# Patient Record
Sex: FEMALE | Race: WHITE | NOT HISPANIC OR LATINO | URBAN - METROPOLITAN AREA
[De-identification: names, ages, dates, MRNs, and addresses within clinical notes are randomized per-mention and may not be internally consistent; named-entity substitution may affect disease eponyms.]

---

## 2023-02-15 ENCOUNTER — HOSPITAL ENCOUNTER (EMERGENCY)
Facility: OTHER | Age: 23
Discharge: HOME | End: 2023-02-15
Attending: EMERGENCY MEDICINE
Payer: COMMERCIAL

## 2023-02-15 VITALS
RESPIRATION RATE: 16 BRPM | OXYGEN SATURATION: 100 % | DIASTOLIC BLOOD PRESSURE: 86 MMHG | SYSTOLIC BLOOD PRESSURE: 138 MMHG | TEMPERATURE: 98 F | HEART RATE: 87 BPM

## 2023-02-15 DIAGNOSIS — H10.32 ACUTE CONJUNCTIVITIS OF LEFT EYE, UNSPECIFIED ACUTE CONJUNCTIVITIS TYPE: ICD-10-CM

## 2023-02-15 DIAGNOSIS — H10.12 ALLERGIC CONJUNCTIVITIS OF LEFT EYE: Primary | ICD-10-CM

## 2023-02-15 DIAGNOSIS — T78.40XA ALLERGIC REACTION, INITIAL ENCOUNTER: ICD-10-CM

## 2023-02-15 PROCEDURE — 25000003 PHARM REV CODE 250: Performed by: PHYSICIAN ASSISTANT

## 2023-02-15 PROCEDURE — 99284 EMERGENCY DEPT VISIT MOD MDM: CPT | Mod: 25

## 2023-02-15 RX ORDER — DIPHENHYDRAMINE HCL 25 MG
25 CAPSULE ORAL
Status: COMPLETED | OUTPATIENT
Start: 2023-02-15 | End: 2023-02-15

## 2023-02-15 RX ORDER — FAMOTIDINE 20 MG/1
20 TABLET, FILM COATED ORAL 2 TIMES DAILY
Qty: 20 TABLET | Refills: 0 | Status: SHIPPED | OUTPATIENT
Start: 2023-02-15 | End: 2024-02-15

## 2023-02-15 RX ORDER — MOXIFLOXACIN 5 MG/ML
1 SOLUTION/ DROPS OPHTHALMIC
Status: COMPLETED | OUTPATIENT
Start: 2023-02-15 | End: 2023-02-15

## 2023-02-15 RX ORDER — DIPHENHYDRAMINE HCL 25 MG
25 CAPSULE ORAL EVERY 6 HOURS PRN
Qty: 20 CAPSULE | Refills: 0 | Status: SHIPPED | OUTPATIENT
Start: 2023-02-15

## 2023-02-15 RX ORDER — FAMOTIDINE 20 MG/1
20 TABLET, FILM COATED ORAL
Status: COMPLETED | OUTPATIENT
Start: 2023-02-15 | End: 2023-02-15

## 2023-02-15 RX ORDER — PREDNISONE 20 MG/1
40 TABLET ORAL DAILY
Qty: 10 TABLET | Refills: 0 | Status: SHIPPED | OUTPATIENT
Start: 2023-02-15 | End: 2023-02-20

## 2023-02-15 RX ORDER — PROPARACAINE HYDROCHLORIDE 5 MG/ML
1 SOLUTION/ DROPS OPHTHALMIC
Status: COMPLETED | OUTPATIENT
Start: 2023-02-15 | End: 2023-02-15

## 2023-02-15 RX ORDER — MOXIFLOXACIN 5 MG/ML
1 SOLUTION/ DROPS OPHTHALMIC 2 TIMES DAILY
Qty: 7 ML | Refills: 0 | Status: SHIPPED | OUTPATIENT
Start: 2023-02-15 | End: 2023-02-22

## 2023-02-15 RX ADMIN — PROPARACAINE HYDROCHLORIDE 1 DROP: 5 SOLUTION/ DROPS OPHTHALMIC at 07:02

## 2023-02-15 RX ADMIN — FLUORESCEIN SODIUM 1 EACH: 1 STRIP OPHTHALMIC at 07:02

## 2023-02-15 RX ADMIN — DIPHENHYDRAMINE HYDROCHLORIDE 25 MG: 25 CAPSULE ORAL at 07:02

## 2023-02-15 RX ADMIN — MOXIFLOXACIN 1 DROP: 5 SOLUTION/ DROPS OPHTHALMIC at 08:02

## 2023-02-15 RX ADMIN — FAMOTIDINE 20 MG: 20 TABLET, FILM COATED ORAL at 07:02

## 2023-02-16 ENCOUNTER — TELEPHONE (OUTPATIENT)
Dept: OPHTHALMOLOGY | Facility: CLINIC | Age: 23
End: 2023-02-16
Payer: COMMERCIAL

## 2023-02-16 NOTE — ED PROVIDER NOTES
Encounter Date: 2/15/2023    SCRIBE #1 NOTE: I, Stephanie Telles, am scribing for, and in the presence of,  MARY BETH Shields.     History     Chief Complaint   Patient presents with    Eye Drainage     Left eye drainage x 20 min      Time seen by provider: 7:23 PM    This is a 22 y.o. female who presents with complaint of sudden swelling in the left eye. The patient also reports pain, irritation, and pressure in the eye. She notes that she is experiencing increased lacrimation and slight yellow drainage. She denies associated fevers, vision changes or throat swelling. Of importance, the patient reports being allergic to all tree nuts and states that she was at the grocery store when her eye began swelling. She also notes that she has monthly contacts which she immediately took out after the swelling started and reports new one was placed earlier this week. Denies any medications for the symptoms.     The history is provided by the patient.   Review of patient's allergies indicates:  Not on File  No past medical history on file.  No past surgical history on file.  No family history on file.     Review of Systems   Constitutional:  Negative for chills and fever.   HENT:  Positive for facial swelling. Negative for congestion, sinus pressure and sore throat.    Eyes:  Positive for pain, discharge and redness.        Positive for epiphora.    Respiratory:  Negative for cough and shortness of breath.    Cardiovascular:  Negative for chest pain.   Gastrointestinal:  Negative for abdominal pain, constipation, diarrhea, nausea and vomiting.   Genitourinary:  Negative for dysuria and hematuria.   Musculoskeletal:  Negative for arthralgias and myalgias.   Skin: Negative.    Neurological:  Negative for weakness, numbness and headaches.   Hematological: Negative.    Psychiatric/Behavioral:  Negative for agitation and confusion.      Physical Exam     Initial Vitals [02/15/23 1800]   BP Pulse Resp Temp SpO2   (!) 175/99 102 20  98.5 °F (36.9 °C) 100 %      MAP       --         Physical Exam    Constitutional: Vital signs are normal. She appears well-developed and well-nourished. She is cooperative. No distress.   Well appearing. Non toxic.   HENT:   Head: Normocephalic and atraumatic.   Eyes: EOM and lids are normal. Pupils are equal, round, and reactive to light. Left eye exhibits chemosis. Left eye exhibits no exudate and no hordeolum. No foreign body present in the left eye. Left conjunctiva is injected. No scleral icterus.   Slit lamp exam:       The left eye shows no corneal abrasion, no corneal flare, no corneal ulcer, no fluorescein uptake and no anterior chamber bulge.   Left eye mild injection. Mild non tender infraorbital edema. Lateral eye with chemosis. PERRL and EOM intact. No other signs of facial edema.   Neck: Trachea normal. No thyroid mass present.   Cardiovascular:  Normal rate.           Pulmonary/Chest: No respiratory distress.     Neurological: She is alert and oriented to person, place, and time. GCS eye subscore is 4. GCS verbal subscore is 5. GCS motor subscore is 6.   Skin: Skin is warm, dry and intact. No rash noted.   Psychiatric: She has a normal mood and affect. Her speech is normal and behavior is normal. Thought content normal.       ED Course   Procedures  Labs Reviewed - No data to display         Imaging Results    None       8:01 PM: Fluorescin stain with no uptake, no ulcer, no abrasion. Pain improved. No hyphema. No hypoxia.        Medications   famotidine tablet 20 mg (20 mg Oral Given 2/15/23 1938)   diphenhydrAMINE capsule 25 mg (25 mg Oral Given 2/15/23 1938)   fluorescein ophthalmic strip 1 each (1 each Both Eyes Given 2/15/23 1939)   proparacaine 0.5 % ophthalmic solution 1 drop (1 drop Both Eyes Given 2/15/23 1939)   moxifloxacin 0.5 % ophthalmic solution 1 drop (1 drop Left Eye Given 2/15/23 2029)     Medical Decision Making:   History:   Old Medical Records: I decided to obtain old medical  records.  Initial Assessment:   Emergent evaluation 22-year-old female with acute left eye injection irritation.  Symptoms began while grocery store.  Patient does report triage allergy suspect possible exposure.  She does report swelling and lid.  Symptoms resolved with Store.  Does report that she took her contacts immediately.  Denies any vision changes.  Does report some foreign body sensation   Differential Diagnosis:   Differential Diagnosis includes, but is not limited to:  Acute glaucoma, open globe, ocular foreign body, retinal detachment, vitreous hemorrhage, endophthalmitis, traumatic injury, orbital fracture, corneal abrasion/ulcer, retinal vascular occlusion, optic neuritis, periorbital/orbital cellulitis, hyphema, hypopion, iritis, UV keratitis, subconjunctival hemorrhage, conjunctivitis, blepharitis, chalazion/hordeolum, benign vitreous floaters.    Clinical Tests:   Lab Tests: Reviewed and Ordered  ED Management:  Plan for visual acuity fluorescein stain.  Suspect allergic component hence antihistamines were ordered in the ED. the contact lens use in foreign body sensation that improved with proparacaine irritant conjunctivitis given increased risk motion due to left occipital cover with moxifloxacin drops although discussed overall impression is consistent with allergic reaction.  She will be started on steroid.  She continued since.  Suspect this was given no continued exposure and believe exposure was at the grocery store.  Additional labs or workup indicated as no other signs of NG anaphylaxis or additional findings.  No signs of ulcer foreign body she will be given information for ophthalmology was encouraged to have close follow-up. Strict instructions to follow up with Given instructions to return for any acute symptoms and verbalized understanding of this medical plan.            Scribe Attestation:   Scribe #1: I performed the above scribed service and the documentation accurately describes  the services I performed. I attest to the accuracy of the note.         PA Attestation for Scribe: I, Elizabeth Jesus  , reviewed documentation as scribed in my presence, which is both accurate and complete.      Clinical Impression:   Final diagnoses:  [H10.12] Allergic conjunctivitis of left eye (Primary)  [H10.32] Acute conjunctivitis of left eye, unspecified acute conjunctivitis type  [T78.40XA] Allergic reaction, initial encounter        ED Disposition Condition    Discharge Stable          ED Prescriptions       Medication Sig Dispense Start Date End Date Auth. Provider    predniSONE (DELTASONE) 20 MG tablet Take 2 tablets (40 mg total) by mouth once daily. for 5 days 10 tablet 2/15/2023 2/20/2023 MARY BETH Shields    famotidine (PEPCID) 20 MG tablet Take 1 tablet (20 mg total) by mouth 2 (two) times daily. 20 tablet 2/15/2023 2/15/2024 MARY BETH Shields    diphenhydrAMINE (BENADRYL) 25 mg capsule Take 1 capsule (25 mg total) by mouth every 6 (six) hours as needed for Itching or Allergies. 20 capsule 2/15/2023 -- MARY BETH Shields    moxifloxacin (VIGAMOX) 0.5 % ophthalmic solution Place 1 drop into the left eye 2 (two) times a day. for 7 days 7 mL 2/15/2023 2/22/2023 MARY BETH Shields          Follow-up Information       Follow up With Specialties Details Why Contact Info    Morales Taylor MD Ophthalmology Schedule an appointment as soon as possible for a visit   [address] [facility]  [address] [place] [id number]  [phone]               MARY BETH Shields  02/16/23 0937

## 2023-02-16 NOTE — FIRST PROVIDER EVALUATION
Emergency Department TeleTriage Encounter Note      CHIEF COMPLAINT    Chief Complaint   Patient presents with    Eye Drainage     Left eye drainage x 20 min        VITAL SIGNS   Initial Vitals [02/15/23 1800]   BP Pulse Resp Temp SpO2   (!) 175/99 102 20 98.5 °F (36.9 °C) 100 %      MAP       --            ALLERGIES    Review of patient's allergies indicates:  Not on File    PROVIDER TRIAGE NOTE  Patient presents with complaint of left eye drainage and swelling. She denied trauma. She reports draining clear stuff.      Phy:   Constitutional: well nourished, well developed, appearing stated age, NAD   HEENT: NCAT, symmetrical lids, No obvious facial deformity.  Normal phonation. Normal Conjunctiva   Neck: NAROM   Respiratory: Normal effort.  No obvious use of accessory muscles   Musculoskeletal: Moved upper extremities well   Neuro: Alert, answers questions appropriately    Psych: appropriate mood and affect      Initial orders will be placed and care will be transferred to an alternate provider when patient is roomed for a full evaluation. Any additional orders and the final disposition will be determined by that provider.        ORDERS  Labs Reviewed   HIV 1 / 2 ANTIBODY   HEPATITIS C ANTIBODY   POCT URINE PREGNANCY       ED Orders (720h ago, onward)      Start Ordered     Status Ordering Provider    02/15/23 1802 02/15/23 1801  HIV 1/2 Ag/Ab (4th Gen)  STAT         Ordered GINNY CAMPBELL II    02/15/23 1802 02/15/23 1801  Hepatitis C Antibody  STAT         Ordered GINNY CAMPBELL II    02/15/23 1802 02/15/23 1801  POCT urine pregnancy  Once         Ordered GINNY CAMPBELL II    Unscheduled 02/15/23 1816  Visual acuity screening  Once         Ordered EUN SINGH              Virtual Visit Note: The provider triage portion of this emergency department evaluation and documentation was performed via [employer], a HIPAA-compliant telemedicine application, in concert with a tele-presenter  in the room. A face to face patient evaluation with one of my colleagues will occur once the patient is placed in an emergency department room.      DISCLAIMER: This note was prepared with [employer] voice recognition transcription software. Garbled syntax, mangled pronouns, and other bizarre constructions may be attributed to that software system.

## 2023-02-16 NOTE — ED TRIAGE NOTES
"Pt to ED via POV with c/o L eye swelling and drainage.  Pt states "I'm allergic to tree nuts and I was in grocery store when symptoms began"  Pt denies visual changes.  Pt AAOx4.  VSS.    Patient identifiers for Corin Carmichael checked and correct   LOC: Patient is awake, alert, and aware of environment with an appropriate affect.  Patient is oriented x4 and speaking appropriately.  APPEARANCE: Patient resting comfortably and in no acute distress.  Patient is clean and well groomed, patient's clothing is properly fastened.  SKIN: The skin is warm and dry.  Patient has normal skin turgor and moist mucus membranes.  Skin is intact: no bruising or breakdown noted.  MUSCULOSKELETAL: Patient is moving all extremities well, no obvious swelling or deformities noted. Pulses intact.  RESPIRATORY: Airway is open and patent.  Respirations are spontaneous, even and unlabored.  Normal effort and rate noted.  CARDIAC: Patient has a normal rate and rhythm.  No peripheral edema noted.  Capillary refill < 3 seconds.  ABDOMEN: No abd distention noted.  Bowel sounds active in all 4 quadrants.  Soft and non-tender upon palpation.  NEUROLOGICAL: PERRLA.  Facial expression is symmetrical.  Hand grasps are equal bilaterally.  Normal sensation in all extremities when touched with finger.  Following commands appropriately.    "

## 2023-02-16 NOTE — TELEPHONE ENCOUNTER
----- Message from Cece Choe sent at 2/16/2023 11:37 AM CST -----  Regarding: ED F/U  Pt called to schedule a ED f/u.     Call back :[phone]

## 2023-02-17 ENCOUNTER — OFFICE VISIT (OUTPATIENT)
Dept: OPHTHALMOLOGY | Facility: CLINIC | Age: 23
End: 2023-02-17
Payer: COMMERCIAL

## 2023-02-17 DIAGNOSIS — H10.12 ALLERGIC CONJUNCTIVITIS OF LEFT EYE: Primary | ICD-10-CM

## 2023-02-17 PROCEDURE — 92004 PR EYE EXAM, NEW PATIENT,COMPREHESV: ICD-10-PCS | Mod: S$GLB,,, | Performed by: OPHTHALMOLOGY

## 2023-02-17 PROCEDURE — 1159F MED LIST DOCD IN RCRD: CPT | Mod: CPTII,S$GLB,, | Performed by: OPHTHALMOLOGY

## 2023-02-17 PROCEDURE — 92004 COMPRE OPH EXAM NEW PT 1/>: CPT | Mod: S$GLB,,, | Performed by: OPHTHALMOLOGY

## 2023-02-17 PROCEDURE — 1160F RVW MEDS BY RX/DR IN RCRD: CPT | Mod: CPTII,S$GLB,, | Performed by: OPHTHALMOLOGY

## 2023-02-17 PROCEDURE — 1159F PR MEDICATION LIST DOCUMENTED IN MEDICAL RECORD: ICD-10-PCS | Mod: CPTII,S$GLB,, | Performed by: OPHTHALMOLOGY

## 2023-02-17 PROCEDURE — 99999 PR PBB SHADOW E&M-EST. PATIENT-LVL II: CPT | Mod: PBBFAC,,, | Performed by: OPHTHALMOLOGY

## 2023-02-17 PROCEDURE — 99999 PR PBB SHADOW E&M-EST. PATIENT-LVL II: ICD-10-PCS | Mod: PBBFAC,,, | Performed by: OPHTHALMOLOGY

## 2023-02-17 PROCEDURE — 1160F PR REVIEW ALL MEDS BY PRESCRIBER/CLIN PHARMACIST DOCUMENTED: ICD-10-PCS | Mod: CPTII,S$GLB,, | Performed by: OPHTHALMOLOGY

## 2023-02-17 NOTE — PROGRESS NOTES
HPI    Patient presents for a cornea evaluation OS. Patient notes swelling of the   conjunctiva OS, possibly due to allergic reaction. Patient is allergic to   nuts. Patient notes 4/10 eye pain. Patient notes no change in vision.  Last edited by Morales Taylor MD on 2/17/2023 10:39 AM.            Assessment /Plan     For exam results, see Encounter Report.    Allergic conjunctivitis of left eye      Resolved, finish course of steroids/abx  OK to restart CTl next week